# Patient Record
Sex: FEMALE | Race: WHITE | NOT HISPANIC OR LATINO | Employment: FULL TIME | URBAN - METROPOLITAN AREA
[De-identification: names, ages, dates, MRNs, and addresses within clinical notes are randomized per-mention and may not be internally consistent; named-entity substitution may affect disease eponyms.]

---

## 2017-06-07 PROBLEM — G43.109 MIGRAINE WITH AURA AND WITHOUT STATUS MIGRAINOSUS, NOT INTRACTABLE: Status: ACTIVE | Noted: 2017-06-07

## 2017-06-07 PROBLEM — G62.9 NEUROPATHY: Status: ACTIVE | Noted: 2017-06-07

## 2017-06-07 PROBLEM — R42 DIZZINESS AND GIDDINESS: Status: ACTIVE | Noted: 2017-06-07

## 2017-08-09 ENCOUNTER — APPOINTMENT (OUTPATIENT)
Dept: NEUROLOGY | Facility: MEDICAL CENTER | Age: 57
End: 2017-08-09
Payer: COMMERCIAL

## 2017-09-01 NOTE — PROGRESS NOTES
Order(s) created erroneously. Erroneous order ID: [id number]   Order moved by: LUKAS VILLALOBOS   Order move date/time: 08/31/2017 7:35 PM   Source Patient: [id number]   Source Contact: 08/18/2017   Destination Patient: [id number]   Destination Contact: 02/08/2016

## 2018-07-09 NOTE — PROGRESS NOTES
Order(s) created erroneously. Erroneous order ID: [id number]   Order moved by: LUKAS VILLALOBOS   Order move date/time: 07/09/2018 8:54 AM   Source Patient: [id number]   Source Contact: 06/04/2018   Destination Patient: [id number]   Destination Contact: 02/08/2016

## 2019-04-17 ENCOUNTER — HOSPITAL ENCOUNTER (OUTPATIENT)
Dept: RADIOLOGY | Facility: MEDICAL CENTER | Age: 59
End: 2019-04-17
Attending: PSYCHIATRY & NEUROLOGY
Payer: COMMERCIAL

## 2019-04-17 DIAGNOSIS — G35 MULTIPLE SCLEROSIS (HCC): ICD-10-CM

## 2019-04-17 PROCEDURE — A9585 GADOBUTROL INJECTION: HCPCS | Performed by: PSYCHIATRY & NEUROLOGY

## 2019-04-17 PROCEDURE — 700117 HCHG RX CONTRAST REV CODE 255: Performed by: PSYCHIATRY & NEUROLOGY

## 2019-04-17 PROCEDURE — 70553 MRI BRAIN STEM W/O & W/DYE: CPT

## 2019-04-17 PROCEDURE — 70543 MRI ORBT/FAC/NCK W/O &W/DYE: CPT

## 2019-04-17 RX ORDER — GADOBUTROL 604.72 MG/ML
5.5 INJECTION INTRAVENOUS ONCE
Status: COMPLETED | OUTPATIENT
Start: 2019-04-17 | End: 2019-04-17

## 2019-04-17 RX ADMIN — GADOBUTROL 5.5 ML: 604.72 INJECTION INTRAVENOUS at 16:15

## 2020-12-18 ENCOUNTER — HOSPITAL ENCOUNTER (OUTPATIENT)
Dept: RADIOLOGY | Facility: MEDICAL CENTER | Age: 60
End: 2020-12-18
Payer: COMMERCIAL

## 2020-12-22 ENCOUNTER — HOSPITAL ENCOUNTER (OUTPATIENT)
Dept: RADIOLOGY | Facility: MEDICAL CENTER | Age: 60
End: 2020-12-22
Payer: COMMERCIAL

## 2022-11-25 ENCOUNTER — OCCUPATIONAL MEDICINE (OUTPATIENT)
Dept: URGENT CARE | Facility: PHYSICIAN GROUP | Age: 62
End: 2022-11-25
Payer: COMMERCIAL

## 2022-11-25 VITALS
TEMPERATURE: 97.4 F | HEIGHT: 65 IN | BODY MASS INDEX: 24.49 KG/M2 | OXYGEN SATURATION: 96 % | HEART RATE: 57 BPM | SYSTOLIC BLOOD PRESSURE: 118 MMHG | WEIGHT: 147 LBS | DIASTOLIC BLOOD PRESSURE: 68 MMHG | RESPIRATION RATE: 14 BRPM

## 2022-11-25 DIAGNOSIS — S46.911D STRAIN OF RIGHT SHOULDER, SUBSEQUENT ENCOUNTER: ICD-10-CM

## 2022-11-25 PROCEDURE — 99203 OFFICE O/P NEW LOW 30 MIN: CPT | Performed by: PHYSICIAN ASSISTANT

## 2022-11-25 RX ORDER — RIMEGEPANT SULFATE 75 MG/75MG
TABLET, ORALLY DISINTEGRATING ORAL
COMMUNITY
Start: 2022-11-09 | End: 2023-02-24

## 2022-11-25 RX ORDER — METHOCARBAMOL 750 MG/1
TABLET, FILM COATED ORAL
COMMUNITY
Start: 2022-11-16 | End: 2022-12-10

## 2022-11-25 RX ORDER — HYDROCHLOROTHIAZIDE 12.5 MG/1
12.5 TABLET ORAL DAILY
COMMUNITY
Start: 2022-11-18

## 2022-11-25 RX ORDER — BACLOFEN 10 MG/1
TABLET ORAL
COMMUNITY
Start: 2022-11-17

## 2022-11-25 RX ORDER — MECLIZINE HYDROCHLORIDE 25 MG/1
TABLET ORAL
COMMUNITY
Start: 2022-10-11 | End: 2022-12-10

## 2022-11-25 RX ORDER — FREMANEZUMAB-VFRM 225 MG/1.5ML
INJECTION SUBCUTANEOUS
COMMUNITY
Start: 2022-11-18

## 2022-11-25 NOTE — PROGRESS NOTES
"Subjective:     Sophy Soto is a 62 y.o. female who presents for Shoulder Injury (Pt is here for WC R shoulder injury, hard to sleep on put pressure on it, pain to lift arm up above shoulder level)      Date of injury 11/10/2022: Patient presents for second visit, first in urgent care following right shoulder injury.  Patient sustained this injury after a day where she unloaded a lot of boxes from a pallet.  At the end of the day she noted soreness in both shoulders however over the following days started noticing more focal right shoulder pain.  She has pain with reaching above the right shoulder.  Was seen in the ER at the local hospital and had x-rays which were reportedly negative for fracture and was prescribed Robaxin.  She turned towards improvement until 2 days ago when she tried more aggressive lifting at work and felt like her pain is slightly worse.  She denies any numbness or tingling.  She is right-handed.  She has no contributory comorbidities and no second job     PMH:   No pertinent past medical history to this problem  MEDS:  Medications were reviewed in EMR  ALLERGIES:  Allergies were reviewed in EMR  SOCHX:  Works as a estates manager  FH:   No pertinent family history to this problem       Objective:     /68   Pulse (!) 57   Temp 36.3 °C (97.4 °F) (Temporal)   Resp 14   Ht 1.651 m (5' 5\")   Wt 66.7 kg (147 lb)   SpO2 96%   BMI 24.46 kg/m²     alert nontoxic female no acute distress.  Modest tenderness to palpation over right AC joint.  No deformity, ecchymosis or edema.  90 degrees of pain-free flexion, 90 degrees of abduction.  Internal and external rotation intact pain-free.  Negative Melendez, positive Neer's    Xrays unavailable from [facility]  Assessment/Plan:       1. Strain of right shoulder, subsequent encounter      Released to Restricted Duty FROM 11/25/2022 TO 12/2/2022  Take low-dose anti-inflammatories, 10 pound weight limit avoid reaching above shoulder and follow-up in 1 " week       Differential diagnosis, natural history, supportive care, and indications for immediate follow-up discussed.

## 2022-11-25 NOTE — LETTER
[facility]  [address]  [place] [address]  Phone:  [phone] - Fax:  [phone]   [facility] Progress Report and Disability Certification  Date of Service: 11/25/2022   No Show:  No  Date / Time of Next Visit: 12/2/2022   Claim Information   Patient Name: Sophy Soto  Claim Number:     Employer: [employer]  Date of Injury: 11/10/2022     Insurer / TPA: [employer]  ID / SSN:     Occupation: Estates Manager  Diagnosis: The encounter diagnosis was Strain of right shoulder, subsequent encounter.    Medical Information   Related to Industrial Injury? Yes    Subjective Complaints:  Date of injury 11/10/2022: Patient presents for second visit, first in urgent care following right shoulder injury.  Patient sustained this injury after a day where she unloaded a lot of boxes from a pallet.  At the end of the day she noted soreness in both shoulders however over the following days started noticing more focal right shoulder pain.  She has pain with reaching above the right shoulder.  Was seen in the ER at the local hospital and had x-rays which were reportedly negative for fracture and was prescribed Robaxin.  She turned towards improvement until 2 days ago when she tried more aggressive lifting at work and felt like her pain is slightly worse.  She denies any numbness or tingling.  She is right-handed.  She has no contributory comorbidities and no second job    Objective Findings: alert nontoxic female no acute distress.  Modest tenderness to palpation over right AC joint.  No deformity, ecchymosis or edema.  90 degrees of pain-free flexion, 90 degrees of abduction.  Internal and external rotation intact pain-free.  Negative Melendez, positive Neer's     Pre-Existing Condition(s):     Assessment:   Condition Improved    Status: Additional Care Required  Permanent Disability:No    Plan:      Diagnostics:      Comments:       Disability Information    Status: Released to Restricted Duty    From:  11/25/2022  Through: 12/2/2022 Restrictions are: Temporary   Physical Restrictions   Sitting:    Standing:    Stooping:    Bending:      Squatting:    Walking:    Climbing:    Pushing:      Pulling:    Other:    Reaching Above Shoulder (L):   Reaching Above Shoulder (R): 0 hrs/day     Reaching Below Shoulder (L):    Reaching Below Shoulder (R):      Not to exceed Weight Limits   Carrying(hrs):   Weight Limit(lb): < or = to 10 pounds Lifting(hrs):   Weight  Limit(lb):     Comments: Take low-dose anti-inflammatories, 10 pound weight limit avoid reaching above shoulder and follow-up in 1 week    Repetitive Actions   Hands: i.e. Fine Manipulations from Grasping:     Feet: i.e. Operating Foot Controls:     Driving / Operate Machinery:     Health Care Provider’s Original or Electronic Signature  Mason Wynne P.A.-C. Health Care Provider’s Original or Electronic Signature    Lele Julien DO MPH     Clinic Name / Location: [address] [address] Clinic Phone Number: Dept: [phone]   Appointment Time: 11:00 Am Visit Start Time: 11:39 AM   Check-In Time:  11:11 Am Visit Discharge Time:  11:59 AM   Original-Treating Physician or Chiropractor    Page 2-Insurer/TPA    Page 3-Employer    Page 4-Employee

## 2022-12-02 ENCOUNTER — OCCUPATIONAL MEDICINE (OUTPATIENT)
Dept: URGENT CARE | Facility: PHYSICIAN GROUP | Age: 62
End: 2022-12-02
Payer: COMMERCIAL

## 2022-12-02 VITALS
DIASTOLIC BLOOD PRESSURE: 70 MMHG | RESPIRATION RATE: 14 BRPM | OXYGEN SATURATION: 98 % | SYSTOLIC BLOOD PRESSURE: 110 MMHG | BODY MASS INDEX: 24.49 KG/M2 | TEMPERATURE: 97.3 F | HEIGHT: 65 IN | WEIGHT: 147 LBS | HEART RATE: 62 BPM

## 2022-12-02 DIAGNOSIS — S46.911D STRAIN OF RIGHT SHOULDER, SUBSEQUENT ENCOUNTER: ICD-10-CM

## 2022-12-02 PROCEDURE — 99213 OFFICE O/P EST LOW 20 MIN: CPT | Performed by: FAMILY MEDICINE

## 2022-12-02 RX ORDER — MELOXICAM 7.5 MG/1
7.5 TABLET ORAL DAILY
Qty: 7 TABLET | Refills: 0 | Status: SHIPPED | OUTPATIENT
Start: 2022-12-02 | End: 2022-12-10

## 2022-12-02 NOTE — LETTER
"   [facility] [place] [address]  Phone:  [phone] - Fax:  [phone]   [facility] Progress Report and Disability Certification  Date of Service: 12/2/2022   No Show:  No  Date / Time of Next Visit: 12/10/2022   Claim Information   Patient Name: Sophy Soto  Claim Number:     Employer: [employer]  Date of Injury: 11/10/2022     Insurer / TPA: [employer]  ID / SSN:     Occupation: Estates Manager  Diagnosis: The encounter diagnosis was Strain of right shoulder, subsequent encounter.    Medical Information   Related to Industrial Injury? Yes    Subjective Complaints:  \" Date of injury 11/10/2022: Patient presents for second visit, first in urgent care following right shoulder injury.  Patient sustained this injury after a day where she unloaded a lot of boxes from a pallet.  At the end of the day she noted soreness in both shoulders however over the following days started noticing more focal right shoulder pain.  She has pain with reaching above the right shoulder.  Was seen in the ER at the local hospital and had x-rays which were reportedly negative for fracture and was prescribed Robaxin.  She turned towards improvement until 2 days ago when she tried more aggressive lifting at work and felt like her pain is slightly worse.  She denies any numbness or tingling.  She is right-handed.  She has no contributory comorbidities and no second \"    ** 12/2/2022 2nd UC visit, feels same since last visit. Rt anterior shoulder pain, worse w/ lifting and sleeping on Rt side. No limb weakness    Objective Findings:     Pre-Existing Condition(s):     Assessment:   Condition Same    Status: Additional Care Required  Permanent Disability:No    Plan:      Diagnostics:      Comments:       Disability Information   Status: Released to Restricted Duty    From:  12/2/2022  Through: 12/10/2022 Restrictions are: Temporary   Physical Restrictions "   Sitting:    Standing:    Stooping:    Bending:      Squatting:    Walking:    Climbing:    Pushing:      Pulling:    Other:    Reaching Above Shoulder (L):   Reaching Above Shoulder (R):       Reaching Below Shoulder (L):    Reaching Below Shoulder (R):      Not to exceed Weight Limits   Carrying(hrs):   Weight Limit(lb):   Lifting(hrs):   Weight  Limit(lb):     Comments: ** No lifting, pulling, pushing more than 10 lbs. No overhead lifting/reaching **    Repetitive Actions   Hands: i.e. Fine Manipulations from Grasping:     Feet: i.e. Operating Foot Controls:     Driving / Operate Machinery:     Health Care Provider’s Original or Electronic Signature  Henry Mckenzie M.D. Health Care Provider’s Original or Electronic Signature    Lele Julien DO MPH     Clinic Name / Location: [address] [address] Clinic Phone Number: Dept: [phone]   Appointment Time: 11:00 Am Visit Start Time: 12:08 PM   Check-In Time:  10:51 Am Visit Discharge Time: 12:26 PM    Original-Treating Physician or Chiropractor    Page 2-Insurer/TPA    Page 3-Employer    Page 4-Employee

## 2022-12-10 ENCOUNTER — OCCUPATIONAL MEDICINE (OUTPATIENT)
Dept: URGENT CARE | Facility: PHYSICIAN GROUP | Age: 62
End: 2022-12-10
Payer: COMMERCIAL

## 2022-12-10 VITALS
WEIGHT: 151 LBS | TEMPERATURE: 98.3 F | HEIGHT: 65 IN | RESPIRATION RATE: 16 BRPM | DIASTOLIC BLOOD PRESSURE: 70 MMHG | HEART RATE: 72 BPM | OXYGEN SATURATION: 97 % | BODY MASS INDEX: 25.16 KG/M2 | SYSTOLIC BLOOD PRESSURE: 110 MMHG

## 2022-12-10 DIAGNOSIS — S46.911D STRAIN OF RIGHT SHOULDER, SUBSEQUENT ENCOUNTER: ICD-10-CM

## 2022-12-10 PROCEDURE — 99214 OFFICE O/P EST MOD 30 MIN: CPT | Performed by: FAMILY MEDICINE

## 2022-12-10 RX ORDER — PREDNISONE 20 MG/1
TABLET ORAL
Qty: 12 TABLET | Refills: 0 | Status: SHIPPED | OUTPATIENT
Start: 2022-12-10 | End: 2022-12-10 | Stop reason: SDUPTHER

## 2022-12-10 RX ORDER — ONABOTULINUMTOXINA 100 [USP'U]/1
INJECTION, POWDER, LYOPHILIZED, FOR SOLUTION INTRADERMAL; INTRAMUSCULAR
COMMUNITY
End: 2022-12-10

## 2022-12-10 RX ORDER — PREDNISONE 20 MG/1
TABLET ORAL
Qty: 12 TABLET | Refills: 0 | Status: SHIPPED | OUTPATIENT
Start: 2022-12-10 | End: 2022-12-18

## 2022-12-10 RX ORDER — DENOSUMAB 60 MG/ML
INJECTION SUBCUTANEOUS
COMMUNITY
End: 2022-12-10

## 2022-12-10 RX ORDER — VALACYCLOVIR HYDROCHLORIDE 1 G/1
TABLET, FILM COATED ORAL
COMMUNITY
Start: 2022-09-26 | End: 2022-12-10

## 2022-12-10 NOTE — LETTER
[facility]  [address]  [place] [address]  Phone:  [phone] - Fax:  [phone]   [facility] Progress Report and Disability Certification  Date of Service: 12/10/2022   No Show:  No  Date / Time of Next Visit: 12/20/2022   Claim Information   Patient Name: Sophy Soto  Claim Number:     Employer: [employer]  Date of Injury: 11/10/2022     Insurer / TPA: [employer]  ID / SSN:     Occupation: Estates Manager  Diagnosis: The encounter diagnosis was Strain of right shoulder, subsequent encounter.    Medical Information   Related to Industrial Injury? Yes    Subjective Complaints:  DOI: 11/10/22. Right shoulder is a little better overall. Meloxicam 7.5 mg once a day prescribed on 12/2/22 helped some.   Objective Findings: Right shoulder - decreased abduction due to pain, otherwise essentially normal range of motion.   Pre-Existing Condition(s):     Assessment:   Condition Improved    Status: Additional Care Required  Comments:Return on 12/20/22 or sooner if needed.  Permanent Disability:No    Plan: MedicationPT  Comments:Prednisone course prescribed for anti-inflammatory effect. Physical Therapy ordered due to duration of problem (1 month).    Diagnostics:      Comments:       Disability Information   Status: Released to Restricted Duty    From:  12/10/2022  Through: 12/20/2022 Restrictions are: Temporary   Physical Restrictions   Sitting:    Standing:    Stooping:    Bending:      Squatting:    Walking:    Climbing:    Pushing:      Pulling:    Other:    Reaching Above Shoulder (L):   Reaching Above Shoulder (R):       Reaching Below Shoulder (L):    Reaching Below Shoulder (R):      Not to exceed Weight Limits   Carrying(hrs):   Weight Limit(lb):   Lifting(hrs):   Weight  Limit(lb):     Comments: No lifting, carrying, pulling, or pushing more than 10 pounds. No overhead reaching or lifting with right arm.    Repetitive Actions   Hands: i.e.  Fine Manipulations from Grasping:     Feet: i.e. Operating Foot Controls:     Driving / Operate Machinery:     Health Care Provider’s Original or Electronic Signature  Adama Smith M.D. Health Care Provider’s Original or Electronic Signature    Lele Julien DO MPH     Clinic Name / Location: [address] [address] Clinic Phone Number: Dept: [phone]   Appointment Time: 9:35 Am Visit Start Time: 9:45 AM   Check-In Time:  9:19 Am Visit Discharge Time:  10:27 AM   Original-Treating Physician or Chiropractor    Page 2-Insurer/TPA    Page 3-Employer    Page 4-Employee

## 2022-12-10 NOTE — PROGRESS NOTES
Chief Complaint:    Chief Complaint   Patient presents with    Follow-Up     Worker's comp    Shoulder Injury     Still hurts does not have full range of motion       History of Present Illness:    DOI: 11/10/22. Right shoulder is a little better overall. Meloxicam 7.5 mg once a day prescribed on 22 helped some.      Past Medical History:    No past medical history on file.    Past Surgical History:    Past Surgical History:   Procedure Laterality Date    US-NEEDLE CORE BX-BREAST PANEL      rt breast    ABDOMINAL HYSTERECTOMY TOTAL      HEMORRHOIDECTOMY      TONSILLECTOMY AND ADENOIDECTOMY       Social History:    Social History     Socioeconomic History    Marital status: Single     Spouse name: Not on file    Number of children: Not on file    Years of education: Not on file    Highest education level: Not on file   Occupational History    Not on file   Tobacco Use    Smoking status: Former     Types: Cigarettes     Quit date: 2006     Years since quittin.2    Smokeless tobacco: Not on file   Substance and Sexual Activity    Alcohol use: No    Drug use: No     Comment: former meth addiction    Sexual activity: Yes     Partners: Male   Other Topics Concern    Not on file   Social History Narrative    Not on file     Social Determinants of Health     Financial Resource Strain: Not on file   Food Insecurity: Not on file   Transportation Needs: Not on file   Physical Activity: Not on file   Stress: Not on file   Social Connections: Not on file   Intimate Partner Violence: Not on file   Housing Stability: Not on file     Family History:    Family History   Problem Relation Age of Onset    Hyperlipidemia Mother     Other Mother         0steoporosis    Cancer Father         bladder     Medications:    Current Outpatient Medications on File Prior to Visit   Medication Sig Dispense Refill    EST ESTROGENS-METHYLTEST HS 0.625-1.25 MG Tab Take 1 Tablet by mouth every day.      baclofen (LIORESAL) 10 MG Tab  "TAKE 1 TABLET BY MOUTH THREE TIMES DAILY FOR 21 DAYS      AJOVY 225 MG/1.5ML Solution Auto-injector INJECT 1 PEN SUBCUTANEOUSLY EVERY MONTH      hydroCHLOROthiazide (HYDRODIURIL) 12.5 MG tablet Take 12.5 mg by mouth every day.      NURTEC 75 MG TABLET DISPERSIBLE DISSOLVE 1 TABLET ON THE TONGUE EVERY 24 HOURS       No current facility-administered medications on file prior to visit.     Allergies:    Allergies   Allergen Reactions    Bisphosphonates      Bone pain    Pcn [Penicillins]     Td [Tetanus-Diphtheria Toxoids Td]        Vitals:    Vitals:    12/10/22 0945   BP: 110/70   Pulse: 72   Resp: 16   Temp: 36.8 °C (98.3 °F)   TempSrc: Temporal   SpO2: 97%   Weight: 68.5 kg (151 lb)   Height: 1.651 m (5' 5\")       Physical Exam:    Constitutional: Vital signs reviewed. Appears well-developed and well-nourished. No acute distress.   Eyes: Sclera white, conjunctivae clear.  ENT: External ears normal. Hearing normal.  Pulmonary/Chest: Respirations non-labored.  Musculoskeletal: Right shoulder - decreased abduction due to pain, otherwise essentially normal range of motion.  Neurological: Alert and oriented to person, place, and time. Muscle tone normal. Coordination normal. Light touch and sensation normal.   Skin: No rashes or lesions. Warm, dry, normal turgor.  Psychiatric: Normal mood and affect. Behavior is normal. Judgment and thought content normal.       Medical Decision Makin. Strain of right shoulder, subsequent encounter  - predniSONE (DELTASONE) 20 MG Tab; 2 TABS BY MOUTH ONCE A DAY ON DAYS 1-4, 1 TAB ONCE A DAY ON DAYS 5-8. TAKE WITH FOOD.  Dispense: 12 Tablet; Refill: 0  - Referral to Physical Therapy       Discussed with her DDX, management options, and risks, benefits, and alternatives to treatment plan agreed upon.    DOI: 11/10/22. Right shoulder is a little better overall. Meloxicam 7.5 mg once a day prescribed on 22 helped some.    Right shoulder - decreased abduction due to pain, " otherwise essentially normal range of motion.    Complicated injury due to duration of problem, slow progress, 4th visit today (1 in ER, 3 in urgent care).    Work restrictions: No lifting, carrying, pulling, or pushing more than 10 pounds. No overhead reaching or lifting with right arm.    Prednisone course prescribed for anti-inflammatory effect. Physical Therapy ordered due to duration of problem (1 month).    Return on 12/20/22 or sooner if needed.

## 2022-12-20 ENCOUNTER — OCCUPATIONAL MEDICINE (OUTPATIENT)
Dept: URGENT CARE | Facility: PHYSICIAN GROUP | Age: 62
End: 2022-12-20
Payer: COMMERCIAL

## 2022-12-20 VITALS — DIASTOLIC BLOOD PRESSURE: 80 MMHG | SYSTOLIC BLOOD PRESSURE: 108 MMHG

## 2022-12-20 DIAGNOSIS — S46.911D STRAIN OF RIGHT SHOULDER, SUBSEQUENT ENCOUNTER: ICD-10-CM

## 2022-12-20 PROCEDURE — 99213 OFFICE O/P EST LOW 20 MIN: CPT | Performed by: NURSE PRACTITIONER

## 2022-12-20 ASSESSMENT — ENCOUNTER SYMPTOMS
MYALGIAS: 1
FOCAL WEAKNESS: 1

## 2022-12-20 NOTE — PROGRESS NOTES
"Subjective:     Sophy Soto is a 62 y.o. female who presents for Work-Related Injury      HPI  Pt presents for evaluation of an existing work comp injury.  This is her fourth visit to urgent care for this shoulder injury and fifth visit overall.  Copied from previous visit:    \" Date of injury 11/10/2022: Patient presents for second visit, first in urgent care following right shoulder injury.  Patient sustained this injury after a day where she unloaded a lot of boxes from a pallet.  At the end of the day she noted soreness in both shoulders however over the following days started noticing more focal right shoulder pain.  She has pain with reaching above the right shoulder.  Was seen in the ER at the local hospital and had x-rays which were reportedly negative for fracture and was prescribed Robaxin.  She turned towards improvement until 2 days ago when she tried more aggressive lifting at work and felt like her pain is slightly worse.  She denies any numbness or tingling.  She is right-handed.  She has no contributory comorbidities and no second \"     ** 12/2/2022 2nd  visit, feels same since last visit. Rt anterior shoulder pain, worse w/ lifting and sleeping on Rt side. No limb weakness      12/10/2022: DOI: 11/10/22. Right shoulder is a little better overall. Meloxicam 7.5 mg once a day prescribed on 12/2/22 helped some.     12/20/2022: At her previous visit she was prescribed prednisone 20 mg tapering dose for further relief of her shoulder pain.  Work restrictions include no lifting, carrying, pulling or pushing more than 10 pounds.  No overhead reaching or lifting with right arm.  She was also prescribed physical therapy.  Her first physical therapy appointment starts next week.  She does note great improvement in her discomfort following steroid dose.  Her pain is only occasional with certain movements.  Her range of motion has increased to approximately 100 degrees however, she does continue to have " "difficulty with touching her shoulder and placing her arm in the back.  She is following her work restrictions without any issues.    Review of Systems   Musculoskeletal:  Positive for joint pain and myalgias.   Neurological:  Positive for focal weakness.     PMH: No past medical history on file.  ALLERGIES:   Allergies   Allergen Reactions   • Bisphosphonates      Bone pain   • Pcn [Penicillins]    • Td [Tetanus-Diphtheria Toxoids Td]      SURGHX:   Past Surgical History:   Procedure Laterality Date   • US-NEEDLE CORE BX-BREAST PANEL      rt breast   • ABDOMINAL HYSTERECTOMY TOTAL     • HEMORRHOIDECTOMY     • TONSILLECTOMY AND ADENOIDECTOMY       SOCHX:   Social History     Socioeconomic History   • Marital status: Single   Tobacco Use   • Smoking status: Former     Types: Cigarettes     Quit date: 2006     Years since quittin.2   Substance and Sexual Activity   • Alcohol use: No   • Drug use: No     Comment: former meth addiction   • Sexual activity: Yes     Partners: Male     FH:   Family History   Problem Relation Age of Onset   • Hyperlipidemia Mother    • Other Mother         0steoporosis   • Cancer Father         bladder         Objective:   /80   Pulse (P) 64   Temp (P) 36.7 °C (98 °F) (Temporal)   Ht (P) 1.651 m (5' 5\")   Wt (P) 68.5 kg (151 lb)   SpO2 (P) 99%   BMI (P) 25.13 kg/m²     Physical Exam  Vitals and nursing note reviewed.   Constitutional:       General: She is not in acute distress.     Appearance: Normal appearance. She is normal weight. She is not ill-appearing or toxic-appearing.   HENT:      Head: Normocephalic.      Right Ear: External ear normal.      Left Ear: External ear normal.      Nose: No congestion or rhinorrhea.      Mouth/Throat:      Pharynx: No oropharyngeal exudate or posterior oropharyngeal erythema.   Eyes:      General:         Right eye: No discharge.         Left eye: No discharge.      Pupils: Pupils are equal, round, and reactive to light. "   Pulmonary:      Effort: Pulmonary effort is normal.   Abdominal:      General: Abdomen is flat.   Musculoskeletal:      Right shoulder: Tenderness and bony tenderness present. No swelling, deformity, effusion, laceration or crepitus. Decreased range of motion. Decreased strength. Normal pulse.      Cervical back: Normal range of motion and neck supple.      Comments: Positive painful arc test and  Apley scratch test maneuver.   Skin:     General: Skin is dry.   Neurological:      General: No focal deficit present.      Mental Status: She is alert and oriented to person, place, and time. Mental status is at baseline.   Psychiatric:         Mood and Affect: Mood normal.         Behavior: Behavior normal.         Thought Content: Thought content normal.         Judgment: Judgment normal.       Assessment/Plan:   Assessment    1. Strain of right shoulder, subsequent encounter        Her first physical therapy appointment is next week.  We will continue work restrictions as this is working well for her.  Work restrictions: No lifting, carrying, pulling, or pushing more than 10 pounds. No overhead reaching or lifting with right arm.  Patient to follow-up on 1/3/2023 or sooner for worsening symptoms.  AVS handout given and reviewed with patient. Pt educated on red flags and when to seek treatment back in ER or UC.

## 2022-12-20 NOTE — LETTER
"   [facility] [place] [address]  Phone:  [phone] - Fax:  [phone]   [facility] Progress Report and Disability Certification  Date of Service: 12/20/2022   No Show:  No  Date / Time of Next Visit: 1/4/2023   Claim Information   Patient Name: Sophy Soto  Claim Number:     Employer: [employer]  Date of Injury: 11/10/2022     Insurer / TPA: [employer]  ID / SSN:     Occupation: Estates Manager  Diagnosis: The encounter diagnosis was Strain of right shoulder, subsequent encounter.    Medical Information   Related to Industrial Injury? Yes    Subjective Complaints:  Pt presents for evaluation of an existing work comp injury.  This is her fourth visit to urgent care for this shoulder injury and fifth visit overall.  Copied from previous visit:    \" Date of injury 11/10/2022: Patient presents for second visit, first in urgent care following right shoulder injury.  Patient sustained this injury after a day where she unloaded a lot of boxes from a pallet.  At the end of the day she noted soreness in both shoulders however over the following days started noticing more focal right shoulder pain.  She has pain with reaching above the right shoulder.  Was seen in the ER at the local hospital and had x-rays which were reportedly negative for fracture and was prescribed Robaxin.  She turned towards improvement until 2 days ago when she tried more aggressive lifting at work and felt like her pain is slightly worse.  She denies any numbness or tingling.  She is right-handed.  She has no contributory comorbidities and no second \"     ** 12/2/2022 2nd  visit, feels same since last visit. Rt anterior shoulder pain, worse w/ lifting and sleeping on Rt side. No limb weakness      12/10/2022: DOI: 11/10/22. Right shoulder is a little better overall. Meloxicam 7.5 mg once a day prescribed on 12/2/22 helped some.     12/20/2022: At her previous " visit she was prescribed prednisone 20 mg tapering dose for further relief of her shoulder pain.  Work restrictions include no lifting, carrying, pulling or pushing more than 10 pounds.  No overhead reaching or lifting with right arm.  She was also prescribed physical therapy.  Her first physical therapy appointment starts next week.  She does note great improvement in her discomfort following steroid dose.  Her pain is only occasional with certain movements.  Her range of motion has increased to approximately 100 degrees however, she does continue to have difficulty with touching her shoulder and placing her arm in the back.  She is following her work restrictions without any issues.   Objective Findings: Right shoulder: Tenderness and bony tenderness present. No swelling, deformity, effusion, laceration or crepitus. Decreased range of motion. Decreased strength. Normal pulse.      Cervical back: Normal range of motion and neck supple.      Comments: Positive painful arc test and  Apley scratch test maneuver.      Pre-Existing Condition(s):     Assessment:   Condition Improved    Status: Additional Care Required  Permanent Disability:No    Plan: PT    Diagnostics:      Comments:       Disability Information   Status: Released to Restricted Duty    From:  12/20/2022  Through: 1/4/2023 Restrictions are:     Physical Restrictions   Sitting:    Standing:    Stooping:    Bending:      Squatting:    Walking:    Climbing:    Pushing:      Pulling:    Other:    Reaching Above Shoulder (L):   Reaching Above Shoulder (R):       Reaching Below Shoulder (L):    Reaching Below Shoulder (R):      Not to exceed Weight Limits   Carrying(hrs):   Weight Limit(lb):   Lifting(hrs):   Weight  Limit(lb):     Comments: Work restrictions: No lifting, carrying, pulling, or pushing more than 10 pounds. No overhead reaching or lifting with right arm.  Patient to follow-up on 1/3/2023 or sooner for worsening symptoms.      Repetitive Actions    Hands: i.e. Fine Manipulations from Grasping:     Feet: i.e. Operating Foot Controls:     Driving / Operate Machinery:     Health Care Provider’s Original or Electronic Signature  SARINA Bell Health Care Provider’s Original or Electronic Signature    Lele Julien DO MPH     Clinic Name / Location: [address] [address] Clinic Phone Number: Dept: [phone]   Appointment Time: 9:00 Am Visit Start Time: 8:56 AM   Check-In Time:  8:47 Am Visit Discharge Time:  9:18 AM    Original-Treating Physician or Chiropractor    Page 2-Insurer/TPA    Page 3-Employer    Page 4-Employee

## 2022-12-20 NOTE — Clinical Note
"   [facility] [place] [address]  Phone:  [phone] - Fax:  [phone]   [facility] Progress Report and Disability Certification  Date of Service: 12/20/2022   No Show:  No  Date / Time of Next Visit: 1/4/2023   Claim Information   Patient Name: Sophy Soto  Claim Number:     Employer: [employer] *** Date of Injury: 11/10/2022     Insurer / TPA: [employer] *** ID / SSN:     Occupation: Estates Manager *** Diagnosis: The encounter diagnosis was Strain of right shoulder, subsequent encounter.    Medical Information   Related to Industrial Injury? Yes ***   Subjective Complaints:  Pt presents for evaluation of an existing work comp injury.  This is her fourth visit to urgent care for this shoulder injury and fifth visit overall.  Copied from previous visit:    \" Date of injury 11/10/2022: Patient presents for second visit, first in urgent care following right shoulder injury.  Patient sustained this injury after a day where she unloaded a lot of boxes from a pallet.  At the end of the day she noted soreness in both shoulders however over the following days started noticing more focal right shoulder pain.  She has pain with reaching above the right shoulder.  Was seen in the ER at the local hospital and had x-rays which were reportedly negative for fracture and was prescribed Robaxin.  She turned towards improvement until 2 days ago when she tried more aggressive lifting at work and felt like her pain is slightly worse.  She denies any numbness or tingling.  She is right-handed.  She has no contributory comorbidities and no second \"     ** 12/2/2022 2nd  visit, feels same since last visit. Rt anterior shoulder pain, worse w/ lifting and sleeping on Rt side. No limb weakness      12/10/2022: DOI: 11/10/22. Right shoulder is a little better overall. Meloxicam 7.5 mg once a day prescribed on 12/2/22 helped some.     12/20/2022: At her " previous visit she was prescribed prednisone 20 mg tapering dose for further relief of her shoulder pain.  Work restrictions include no lifting, carrying, pulling or pushing more than 10 pounds.  No overhead reaching or lifting with right arm.  She was also prescribed physical therapy.  Her first physical therapy appointment starts next week.  She does note great improvement in her discomfort following steroid dose.  Her pain is only occasional with certain movements.  Her range of motion has increased to approximately 100 degrees however, she does continue to have difficulty with touching her shoulder and placing her arm in the back.  She is following her work restrictions without any issues.   Objective Findings: Right shoulder: Tenderness and bony tenderness present. No swelling, deformity, effusion, laceration or crepitus. Decreased range of motion. Decreased strength. Normal pulse.      Cervical back: Normal range of motion and neck supple.      Comments: Positive painful arc test and  Apley scratch test maneuver.      Pre-Existing Condition(s):     Assessment:   Condition Improved    Status: Additional Care Required  Permanent Disability:No    Plan: PT    Diagnostics:      Comments:       Disability Information   Status: Released to Restricted Duty    From:  12/20/2022  Through: 1/4/2023 Restrictions are:     Physical Restrictions   Sitting:    Standing:    Stooping:    Bending:      Squatting:    Walking:    Climbing:    Pushing:      Pulling:    Other:    Reaching Above Shoulder (L):   Reaching Above Shoulder (R):       Reaching Below Shoulder (L):    Reaching Below Shoulder (R):      Not to exceed Weight Limits   Carrying(hrs):   Weight Limit(lb):   Lifting(hrs):   Weight  Limit(lb):     Comments: Work restrictions: No lifting, carrying, pulling, or pushing more than 10 pounds. No overhead reaching or lifting with right arm.  Patient to follow-up on 1/3/2023 or sooner for worsening symptoms.      Repetitive  Actions   Hands: i.e. Fine Manipulations from Grasping:     Feet: i.e. Operating Foot Controls:     Driving / Operate Machinery:     Health Care Provider’s Original or Electronic Signature  SARINA Bell Health Care Provider’s Original or Electronic Signature    Lele Julien DO MPH     Clinic Name / Location: [address] [address] Clinic Phone Number: Dept: [phone]   Appointment Time: 9:00 Am Visit Start Time: 8:56 AM   Check-In Time:  8:47 Am Visit Discharge Time:  ***   Original-Treating Physician or Chiropractor    Page 2-Insurer/TPA    Page 3-Employer    Page 4-Employee

## 2023-01-10 ENCOUNTER — OCCUPATIONAL MEDICINE (OUTPATIENT)
Dept: URGENT CARE | Facility: PHYSICIAN GROUP | Age: 63
End: 2023-01-10
Payer: COMMERCIAL

## 2023-01-10 VITALS
RESPIRATION RATE: 16 BRPM | BODY MASS INDEX: 25.16 KG/M2 | HEART RATE: 79 BPM | SYSTOLIC BLOOD PRESSURE: 110 MMHG | OXYGEN SATURATION: 98 % | HEIGHT: 65 IN | WEIGHT: 151 LBS | TEMPERATURE: 98.1 F | DIASTOLIC BLOOD PRESSURE: 70 MMHG

## 2023-01-10 DIAGNOSIS — S46.911D STRAIN OF RIGHT SHOULDER, SUBSEQUENT ENCOUNTER: ICD-10-CM

## 2023-01-10 PROCEDURE — 99214 OFFICE O/P EST MOD 30 MIN: CPT | Performed by: NURSE PRACTITIONER

## 2023-01-10 RX ORDER — VALACYCLOVIR HYDROCHLORIDE 1 G/1
TABLET, FILM COATED ORAL
COMMUNITY
Start: 2022-12-19

## 2023-01-10 ASSESSMENT — ENCOUNTER SYMPTOMS
MYALGIAS: 1
CHILLS: 0
FEVER: 0
FALLS: 1
TINGLING: 1
FOCAL WEAKNESS: 1
SENSORY CHANGE: 1

## 2023-01-10 NOTE — LETTER
"   [facility] [place] [address]  Phone:  [phone] - Fax:  [phone]   [facility] Progress Report and Disability Certification  Date of Service: 1/10/2023   No Show:  No  Date / Time of Next Visit: 1/31/2023   Claim Information   Patient Name: Sophy Soto  Claim Number:     Employer: [employer]  Date of Injury: 11/10/2022     Insurer / TPA: [employer]  ID / SSN:     Occupation: Estates Manager  Diagnosis: The encounter diagnosis was Strain of right shoulder, subsequent encounter.    Medical Information   Related to Industrial Injury? Yes    Subjective Complaints:  Pt presents for evaluation of an existing work comp injury.  This is her 5 th visit to urgent care for this shoulder injury and 6th  visit overall.  Copied from previous visit:     \" Date of injury 11/10/2022: Patient presents for second visit, first in urgent care following right shoulder injury.  Patient sustained this injury after a day where she unloaded a lot of boxes from a pallet.  At the end of the day she noted soreness in both shoulders however over the following days started noticing more focal right shoulder pain.  She has pain with reaching above the right shoulder.  Was seen in the ER at the local hospital and had x-rays which were reportedly negative for fracture and was prescribed Robaxin.  She turned towards improvement until 2 days ago when she tried more aggressive lifting at work and felt like her pain is slightly worse.  She denies any numbness or tingling.  She is right-handed.  She has no contributory comorbidities and no second \"     ** 12/2/2022 2nd  visit, feels same since last visit. Rt anterior shoulder pain, worse w/ lifting and sleeping on Rt side. No limb weakness       12/10/2022: DOI: 11/10/22. Right shoulder is a little better overall. Meloxicam 7.5 mg once a day prescribed on 12/2/22 helped some.     12/20/2022: At her previous " visit she was prescribed prednisone 20 mg tapering dose for further relief of her shoulder pain.  Work restrictions include no lifting, carrying, pulling or pushing more than 10 pounds.  No overhead reaching or lifting with right arm.  She was also prescribed physical therapy.  Her first physical therapy appointment starts next week.  She does note great improvement in her discomfort following steroid dose.  Her pain is only occasional with certain movements.  Her range of motion has increased to approximately 100 degrees however, she does continue to have difficulty with touching her shoulder and placing her arm in the back.  She is following her work restrictions without any issues.    1/10/2023: This is tannates with visit to urgent care for the same work comp related issue.  She continues to endorse right-sided shoulder pain and weakness.  This is exacerbated with certain movements.  She has been attending physical therapy as prescribed and states that she has 3 visits left.  She notes some improvement in range of motion however, she is continuing to experience pain.  It does seem as though her improvement has somewhat plateaued at this point.  She continues to remain on work restrictions as prescribed.  She does use Tylenol and ibuprofen as needed.  She does continue to have numbness and tingling into right fingertips.   Objective Findings: Right shoulder: Tenderness and bony tenderness present. No swelling, deformity, effusion, laceration or crepitus. Decreased range of motion. Decreased strength. Normal pulse.      Cervical back: Normal range of motion and neck supple.      Comments: Positive straight arm raise.  Strength of right upper extremity 3/5, strength of left upper extremity 5/5.      Pre-Existing Condition(s):     Assessment:   Condition Same    Status: Discharged / Care Transfer  Permanent Disability:No    Plan: Diagnostics  Comments:mri ordered    Diagnostics: MRI    Comments:       Disability  Information   Status: Released to Restricted Duty    From:  1/10/2023  Through: 1/31/2023 Restrictions are: Temporary   Physical Restrictions   Sitting:    Standing:    Stooping:    Bending:      Squatting:    Walking:    Climbing:    Pushing:      Pulling:    Other:    Reaching Above Shoulder (L):   Reaching Above Shoulder (R):       Reaching Below Shoulder (L):    Reaching Below Shoulder (R):      Not to exceed Weight Limits   Carrying(hrs):   Weight Limit(lb):   Lifting(hrs):   Weight  Limit(lb):     Comments: No use of right upper extremity    Repetitive Actions   Hands: i.e. Fine Manipulations from Grasping:     Feet: i.e. Operating Foot Controls:     Driving / Operate Machinery:     Health Care Provider’s Original or Electronic Signature  JACQUELINE BellP.R.N. Health Care Provider’s Original or Electronic Signature    Lele Julien DO MPH     Clinic Name / Location: [address] [address] Clinic Phone Number: Dept: [phone]   Appointment Time: 2:00 Pm Visit Start Time: 2:11 PM   Check-In Time:  1:48 Pm Visit Discharge Time:  2:52 pm    Original-Treating Physician or Chiropractor    Page 2-Insurer/TPA    Page 3-Employer    Page 4-Employee

## 2023-01-10 NOTE — PROGRESS NOTES
"Subjective:     Sophy Soto is a 62 y.o. female who presents for Follow-Up (Workers comp) and Shoulder Injury (Started her Physical Therapy)      Shoulder Injury   Associated symptoms include tingling.   Pt presents for evaluation of an existing work comp injury.  This is her 5th visit to urgent care for this shoulder injury and 6th visit overall.  Copied from previous visit:     \" Date of injury 11/10/2022: Patient presents for second visit, first in urgent care following right shoulder injury.  Patient sustained this injury after a day where she unloaded a lot of boxes from a pallet.  At the end of the day she noted soreness in both shoulders however over the following days started noticing more focal right shoulder pain.  She has pain with reaching above the right shoulder.  Was seen in the ER at the local hospital and had x-rays which were reportedly negative for fracture and was prescribed Robaxin.  She turned towards improvement until 2 days ago when she tried more aggressive lifting at work and felt like her pain is slightly worse.  She denies any numbness or tingling.  She is right-handed.  She has no contributory comorbidities and no second \"     ** 12/2/2022 2nd UC visit, feels same since last visit. Rt anterior shoulder pain, worse w/ lifting and sleeping on Rt side. No limb weakness       12/10/2022: DOI: 11/10/22. Right shoulder is a little better overall. Meloxicam 7.5 mg once a day prescribed on 12/2/22 helped some.     12/20/2022: At her previous visit she was prescribed prednisone 20 mg tapering dose for further relief of her shoulder pain.  Work restrictions include no lifting, carrying, pulling or pushing more than 10 pounds.  No overhead reaching or lifting with right arm.  She was also prescribed physical therapy.  Her first physical therapy appointment starts next week.  She does note great improvement in her discomfort following steroid dose.  Her pain is only occasional with certain " movements.  Her range of motion has increased to approximately 100 degrees however, she does continue to have difficulty with touching her shoulder and placing her arm in the back.  She is following her work restrictions without any issues.    1/10/2023: This is tannates with visit to urgent care for the same work comp related issue.  She continues to endorse right-sided shoulder pain and weakness.  This is exacerbated with certain movements.  She has been attending physical therapy as prescribed and states that she has 3 visits left.  She notes some improvement in range of motion however, she is continuing to experience pain.  It does seem as though her improvement has somewhat plateaued at this point.  She continues to remain on work restrictions as prescribed.  She does use Tylenol and ibuprofen as needed.  She does continue to have numbness and tingling into right fingertips.  Review of Systems   Constitutional:  Negative for chills and fever.   Musculoskeletal:  Positive for falls, joint pain and myalgias.   Neurological:  Positive for tingling, sensory change and focal weakness.     PMH: No past medical history on file.  ALLERGIES:   Allergies   Allergen Reactions    Bee Venom      Other reaction(s): Anaphylactic reaction    Bisphosphonates      Bone pain    Pcn [Penicillins]     Td [Tetanus-Diphtheria Toxoids Td]     Tomato Extract Allergy Skin Test Rash and Shortness of Breath     SURGHX:   Past Surgical History:   Procedure Laterality Date    US-NEEDLE CORE BX-BREAST PANEL      rt breast    ABDOMINAL HYSTERECTOMY TOTAL      HEMORRHOIDECTOMY      TONSILLECTOMY AND ADENOIDECTOMY       SOCHX:   Social History     Socioeconomic History    Marital status: Single   Tobacco Use    Smoking status: Former     Types: Cigarettes     Quit date: 2006     Years since quittin.2   Substance and Sexual Activity    Alcohol use: No    Drug use: No     Comment: former meth addiction    Sexual activity: Yes      "Partners: Male     FH:   Family History   Problem Relation Age of Onset    Hyperlipidemia Mother     Other Mother         0steoporosis    Cancer Father         bladder         Objective:   /70   Pulse 79   Temp 36.7 °C (98.1 °F) (Temporal)   Resp 16   Ht 1.651 m (5' 5\")   Wt 68.5 kg (151 lb)   SpO2 98%   BMI 25.13 kg/m²     Physical Exam  Vitals and nursing note reviewed.   Constitutional:       General: She is not in acute distress.     Appearance: Normal appearance. She is not ill-appearing.   HENT:      Head: Normocephalic and atraumatic.      Right Ear: External ear normal.      Left Ear: External ear normal.      Nose: No congestion or rhinorrhea.      Mouth/Throat:      Mouth: Mucous membranes are moist.   Eyes:      Extraocular Movements: Extraocular movements intact.      Pupils: Pupils are equal, round, and reactive to light.   Cardiovascular:      Rate and Rhythm: Normal rate and regular rhythm.      Pulses: Normal pulses.      Heart sounds: Normal heart sounds.   Pulmonary:      Effort: Pulmonary effort is normal.      Breath sounds: Normal breath sounds.   Abdominal:      General: Abdomen is flat. Bowel sounds are normal.      Palpations: Abdomen is soft.   Musculoskeletal:      Right shoulder: Tenderness and bony tenderness present. No swelling, deformity, effusion, laceration or crepitus. Decreased range of motion. Decreased strength. Normal pulse.      Cervical back: Normal range of motion and neck supple.      Comments: Positive straight arm raise.  Strength of right upper extremity 3/5, strength of left upper extremity 5/5.   Skin:     General: Skin is warm and dry.      Capillary Refill: Capillary refill takes less than 2 seconds.   Neurological:      General: No focal deficit present.      Mental Status: She is alert and oriented to person, place, and time. Mental status is at baseline.   Psychiatric:         Mood and Affect: Mood normal.         Behavior: Behavior normal.         " Thought Content: Thought content normal.         Judgment: Judgment normal.       Assessment/Plan:   Assessment    1. Strain of right shoulder, subsequent encounter  MR-SHOULDER-W/O RIGHT    Referral to Orthopedics        Sophy was referred to follow-up with orthopedics for further evaluation and treatment of her right shoulder pain following her work-related injury.  Additionally, as her symptoms continue she was also ordered an MRI as no imaging has been performed since that x-ray at time of injury.  Patient continue with physical therapy as prescribed.  She may continue to use ibuprofen, Tylenol, ice and rest.  No further care necessary in urgent care as she is being referred to specialist.

## 2023-01-31 ENCOUNTER — OCCUPATIONAL MEDICINE (OUTPATIENT)
Dept: URGENT CARE | Facility: PHYSICIAN GROUP | Age: 63
End: 2023-01-31
Payer: COMMERCIAL

## 2023-01-31 VITALS
HEART RATE: 74 BPM | SYSTOLIC BLOOD PRESSURE: 110 MMHG | OXYGEN SATURATION: 97 % | BODY MASS INDEX: 25.16 KG/M2 | HEIGHT: 65 IN | TEMPERATURE: 98 F | RESPIRATION RATE: 16 BRPM | WEIGHT: 151 LBS | DIASTOLIC BLOOD PRESSURE: 72 MMHG

## 2023-01-31 DIAGNOSIS — S46.911D STRAIN OF RIGHT SHOULDER, SUBSEQUENT ENCOUNTER: ICD-10-CM

## 2023-01-31 PROCEDURE — 99213 OFFICE O/P EST LOW 20 MIN: CPT | Performed by: PHYSICIAN ASSISTANT

## 2023-01-31 NOTE — LETTER
"   [facility] [place] [address]  Phone:  [phone] - Fax:  [phone]   [facility] Progress Report and Disability Certification  Date of Service: 1/31/2023   No Show:  No  Date / Time of Next Visit: 2/14/2023   Claim Information   Patient Name: Sophy Soto  Claim Number:     Employer: [employer]  Date of Injury: 11/10/2022     Insurer / TPA: [employer]  ID / SSN:     Occupation: Estates Manager  Diagnosis: The encounter diagnosis was Strain of right shoulder, subsequent encounter.    Medical Information   Related to Industrial Injury? Yes    Subjective Complaints:  \" Date of injury 11/10/2022: Patient presents for second visit, first in urgent care following right shoulder injury.  Patient sustained this injury after a day where she unloaded a lot of boxes from a pallet.  At the end of the day she noted soreness in both shoulders however over the following days started noticing more focal right shoulder pain.  She has pain with reaching above the right shoulder.  Was seen in the ER at the local hospital and had x-rays which were reportedly negative for fracture and was prescribed Robaxin.  She turned towards improvement until 2 days ago when she tried more aggressive lifting at work and felt like her pain is slightly worse.  She denies any numbness or tingling.  She is right-handed.  She has no contributory comorbidities and no second \"      This is the patient's sixth urgent care visit regarding work-related injury.  She has completed physical therapy and states that it was helping with her range of motion.  She states that she still does use ibuprofen or Tylenol per 's instructions if needed.  She heard back from the orthopedic specialist but they state that they cannot see her until the referral was placed for Worker's Comp. reasoning rather than regular referral.  She has still been not using the right upper " extremity as previous guidelines have outlined.   Objective Findings: Patient has 3/5 strength of the right upper extremity against resistance.  Patient does have range of motion to about shoulder height before pain occurs laterally and anteriorly.  Neurovascular intact distally. 5/5  strength bilaterally.  Tenderness to palpation of the anterior aspect of the right shoulder.   Pre-Existing Condition(s):     Assessment:   Condition Same    Status: Discharged / Care Transfer  Comments:Follow-up with Ortho  Permanent Disability:No    Plan:      Diagnostics:      Comments:       Disability Information   Status: Released to Restricted Duty    From:  1/31/2023  Through: 2/14/2023 Restrictions are: Temporary   Physical Restrictions   Sitting:    Standing:    Stooping:    Bending:      Squatting:    Walking:    Climbing:    Pushing:      Pulling:    Other:    Reaching Above Shoulder (L):   Reaching Above Shoulder (R):       Reaching Below Shoulder (L):    Reaching Below Shoulder (R):      Not to exceed Weight Limits   Carrying(hrs):   Weight Limit(lb):   Lifting(hrs):   Weight  Limit(lb):     Comments: No use of right upper extremity still at this time and recommend she follow-up with orthopedic specialist.  Place new referral that specified that this is a work-related injury.  She will follow-up with him for MRI.  Offered to refer her to physical therapy again for her to continue that but she declines.  Recommend continuing range of motion exercises as well as ice and ibuprofen or Tylenol per 's instructions if needed.    Repetitive Actions   Hands: i.e. Fine Manipulations from Grasping:     Feet: i.e. Operating Foot Controls:     Driving / Operate Machinery:     Health Care Provider’s Original or Electronic Signature  Stuart Reeder P.A.-C. Health Care Provider’s Original or Electronic Signature    Lele Julien DO MPH     Clinic Name / Location: [address] [place]  [address] Clinic Phone Number: Dept: [phone]   Appointment Time: 10:30 Am Visit Start Time: 10:41 AM   Check-In Time:  10:28 Am Visit Discharge Time:  11:15 AM    Original-Treating Physician or Chiropractor    Page 2-Insurer/TPA    Page 3-Employer    Page 4-Employee

## 2023-01-31 NOTE — PROGRESS NOTES
"Subjective     Sophy Soto is a 62 y.o. female who presents with Follow-Up (Worker's Comp) and Shoulder Injury (Still has not heard about the MRI or ortho referral/)      \" Date of injury 11/10/2022: Patient presents for second visit, first in urgent care following right shoulder injury.  Patient sustained this injury after a day where she unloaded a lot of boxes from a pallet.  At the end of the day she noted soreness in both shoulders however over the following days started noticing more focal right shoulder pain.  She has pain with reaching above the right shoulder.  Was seen in the ER at the local hospital and had x-rays which were reportedly negative for fracture and was prescribed Robaxin.  She turned towards improvement until 2 days ago when she tried more aggressive lifting at work and felt like her pain is slightly worse.  She denies any numbness or tingling.  She is right-handed.  She has no contributory comorbidities and no second \"      This is the patient's sixth urgent care visit regarding work-related injury.  She has completed physical therapy and states that it was helping with her range of motion.  She states that she still does use ibuprofen or Tylenol per 's instructions if needed.  She heard back from the orthopedic specialist but they state that they cannot see her until the referral was placed for Worker's Comp. reasoning rather than regular referral.  She has still been not using the right upper extremity as previous guidelines have outlined.     HPI  Patient presents today for follow-up of work-related injury as described above.  Review of Systems   Musculoskeletal:         Right shoulder pain     PMH: No pertinent past medical history to this problem  MEDS: Medications were reviewed in Epic  ALLERGIES: Allergies were reviewed in Epic  SOCHX: Works as an estates manager at [employer]  FH: No pertinent family history to this problem         Objective     /72   Pulse " "74   Temp 36.7 °C (98 °F) (Temporal)   Resp 16   Ht 1.651 m (5' 5\")   Wt 68.5 kg (151 lb)   SpO2 97%   BMI 25.13 kg/m²      Physical Exam  Vitals and nursing note reviewed.   Constitutional:       General: She is not in acute distress.     Appearance: Normal appearance. She is well-developed. She is not ill-appearing or toxic-appearing.   HENT:      Head: Normocephalic and atraumatic.      Right Ear: Hearing normal.      Left Ear: Hearing normal.   Cardiovascular:      Rate and Rhythm: Normal rate.   Pulmonary:      Effort: Pulmonary effort is normal.   Musculoskeletal:      Comments: Right shoulder as described below   Skin:     General: Skin is warm and dry.   Neurological:      Mental Status: She is alert.      Coordination: Coordination normal.   Psychiatric:         Mood and Affect: Mood normal.       Patient has 3/5 strength of the right upper extremity against resistance.  Patient does have range of motion to about shoulder height before pain occurs laterally and anteriorly.  Neurovascular intact distally. 5/5  strength bilaterally.  Tenderness to palpation of the anterior aspect of the right shoulder.             Assessment & Plan   1. Strain of right shoulder, subsequent encounter    - Referral to Orthopedics  No use of right upper extremity still at this time and recommend she follow-up with orthopedic specialist.  Place new referral that specified that this is a work-related injury.  She will follow-up with him for MRI.  Offered to refer her to physical therapy again for her to continue that but she declines.  Recommend continuing range of motion exercises as well as ice and ibuprofen or Tylenol per 's instructions if needed.       Please note that this dictation was created using voice recognition software. I have made every reasonable attempt to correct obvious errors, but I expect that there may be errors of grammar and possibly content that I did not discover before finalizing " the note.

## 2023-02-21 PROBLEM — M75.51 BURSITIS OF RIGHT SHOULDER: Status: ACTIVE | Noted: 2023-02-21

## 2023-02-21 PROBLEM — M67.40 GANGLION CYST: Status: ACTIVE | Noted: 2023-02-21

## 2023-02-21 PROBLEM — M75.81 TENDINITIS OF RIGHT ROTATOR CUFF: Status: ACTIVE | Noted: 2023-02-21

## 2023-02-21 PROBLEM — S46.011A: Status: ACTIVE | Noted: 2023-02-21

## 2023-06-29 ENCOUNTER — APPOINTMENT (OUTPATIENT)
Dept: LAB | Facility: MEDICAL CENTER | Age: 63
End: 2023-06-29
Payer: COMMERCIAL

## 2024-10-30 ENCOUNTER — HOSPITAL ENCOUNTER (OUTPATIENT)
Dept: LAB | Facility: MEDICAL CENTER | Age: 64
End: 2024-10-30
Attending: PHYSICIAN ASSISTANT
Payer: OTHER MISCELLANEOUS

## 2024-10-30 LAB
ALBUMIN SERPL BCP-MCNC: 4.2 G/DL (ref 3.2–4.9)
ALBUMIN/GLOB SERPL: 1.5 G/DL
ALP SERPL-CCNC: 82 U/L (ref 30–99)
ALT SERPL-CCNC: 19 U/L (ref 2–50)
ANION GAP SERPL CALC-SCNC: 9 MMOL/L (ref 7–16)
AST SERPL-CCNC: 17 U/L (ref 12–45)
BASOPHILS # BLD AUTO: 1.5 % (ref 0–1.8)
BASOPHILS # BLD: 0.1 K/UL (ref 0–0.12)
BILIRUB SERPL-MCNC: 0.9 MG/DL (ref 0.1–1.5)
BUN SERPL-MCNC: 11 MG/DL (ref 8–22)
CALCIUM ALBUM COR SERPL-MCNC: 9.4 MG/DL (ref 8.5–10.5)
CALCIUM SERPL-MCNC: 9.6 MG/DL (ref 8.5–10.5)
CHLORIDE SERPL-SCNC: 102 MMOL/L (ref 96–112)
CHOLEST SERPL-MCNC: 204 MG/DL (ref 100–199)
CO2 SERPL-SCNC: 24 MMOL/L (ref 20–33)
CREAT SERPL-MCNC: 0.63 MG/DL (ref 0.5–1.4)
EOSINOPHIL # BLD AUTO: 0.08 K/UL (ref 0–0.51)
EOSINOPHIL NFR BLD: 1.2 % (ref 0–6.9)
ERYTHROCYTE [DISTWIDTH] IN BLOOD BY AUTOMATED COUNT: 44.7 FL (ref 35.9–50)
FASTING STATUS PATIENT QL REPORTED: NORMAL
GFR SERPLBLD CREATININE-BSD FMLA CKD-EPI: 99 ML/MIN/1.73 M 2
GLOBULIN SER CALC-MCNC: 2.8 G/DL (ref 1.9–3.5)
GLUCOSE SERPL-MCNC: 81 MG/DL (ref 65–99)
HCT VFR BLD AUTO: 44.3 % (ref 37–47)
HDLC SERPL-MCNC: 74 MG/DL
HGB BLD-MCNC: 14.8 G/DL (ref 12–16)
IMM GRANULOCYTES # BLD AUTO: 0.01 K/UL (ref 0–0.11)
IMM GRANULOCYTES NFR BLD AUTO: 0.2 % (ref 0–0.9)
LDLC SERPL CALC-MCNC: 117 MG/DL
LYMPHOCYTES # BLD AUTO: 2.38 K/UL (ref 1–4.8)
LYMPHOCYTES NFR BLD: 35.8 % (ref 22–41)
MCH RBC QN AUTO: 30.6 PG (ref 27–33)
MCHC RBC AUTO-ENTMCNC: 33.4 G/DL (ref 32.2–35.5)
MCV RBC AUTO: 91.7 FL (ref 81.4–97.8)
MONOCYTES # BLD AUTO: 0.58 K/UL (ref 0–0.85)
MONOCYTES NFR BLD AUTO: 8.7 % (ref 0–13.4)
NEUTROPHILS # BLD AUTO: 3.49 K/UL (ref 1.82–7.42)
NEUTROPHILS NFR BLD: 52.6 % (ref 44–72)
NRBC # BLD AUTO: 0 K/UL
NRBC BLD-RTO: 0 /100 WBC (ref 0–0.2)
PLATELET # BLD AUTO: 303 K/UL (ref 164–446)
PMV BLD AUTO: 10.4 FL (ref 9–12.9)
POTASSIUM SERPL-SCNC: 4.2 MMOL/L (ref 3.6–5.5)
PROT SERPL-MCNC: 7 G/DL (ref 6–8.2)
RBC # BLD AUTO: 4.83 M/UL (ref 4.2–5.4)
SODIUM SERPL-SCNC: 135 MMOL/L (ref 135–145)
TRIGL SERPL-MCNC: 63 MG/DL (ref 0–149)
WBC # BLD AUTO: 6.6 K/UL (ref 4.8–10.8)

## 2024-10-30 PROCEDURE — 80053 COMPREHEN METABOLIC PANEL: CPT

## 2024-10-30 PROCEDURE — 85025 COMPLETE CBC W/AUTO DIFF WBC: CPT

## 2024-10-30 PROCEDURE — 84443 ASSAY THYROID STIM HORMONE: CPT

## 2024-10-30 PROCEDURE — 36415 COLL VENOUS BLD VENIPUNCTURE: CPT

## 2024-10-30 PROCEDURE — 80061 LIPID PANEL: CPT

## 2024-10-31 LAB — TSH SERPL-ACNC: 0.98 UIU/ML (ref 0.35–5.5)
